# Patient Record
(demographics unavailable — no encounter records)

---

## 2024-11-26 NOTE — HEALTH RISK ASSESSMENT
[Excellent] : ~his/her~  mood as  excellent [No falls in past year] : Patient reported no falls in the past year [0] : 2) Feeling down, depressed, or hopeless: Not at all (0) [PHQ-2 Negative - No further assessment needed] : PHQ-2 Negative - No further assessment needed [Never] : Never [HIV Test offered] : HIV Test offered [Change in mental status noted] : Change in mental status noted [None] : None [With Family] : lives with family [# of Members in Household ___] :  household currently consist of [unfilled] member(s) [Retired] : retired [] :  [Fully functional (bathing, dressing, toileting, transferring, walking, feeding)] : Fully functional (bathing, dressing, toileting, transferring, walking, feeding) [NO] : No [Patient declined colonoscopy] : Patient declined colonoscopy [Hepatitis C test offered] : Hepatitis C test offered [No] : No [[id number]] : 0 [Reports changes in hearing] : Reports no changes in hearing [Reports changes in vision] : Reports no changes in vision [ColonoscopyDate] : never [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

## 2024-11-26 NOTE — PLAN
[FreeTextEntry1] : #b/l impacted cerumen #hearing loss performed b/l ear flushing given referral to ENT  #Dementia follows with Dr. Stephenson for neurology renewed memantine 10mg BID  #HLD sent lipid panel Advised patient on regular exercise, weight loss, and maintaining a low fat diet renewed simvastatin 20mg QD   #HTN BP in office today 120/70 Encouraged regular exercise, weight loss, adhering to a DASH diet, minimizing alcohol intake, getting adequate sleep, and monitoring BP at home regularly Advised to take blood pressure at home if patient experiences dizziness, lightheadedness, or get a sudden headache  renewed lisinopril 20mg QD  #Health Maintenance Has never had colonoscopy performed. Patient is now 77 y/o Routine EKG performed today Routine labs sent including CBC, CMP, A1C, Lipid Profile, TSH w/ reflex, HIV, Hep C, UA, PSA administered flu vaccine today

## 2024-11-26 NOTE — PHYSICAL EXAM
[No Acute Distress] : no acute distress [Well Nourished] : well nourished [Well Developed] : well developed [Well-Appearing] : well-appearing [Normal Sclera/Conjunctiva] : normal sclera/conjunctiva [PERRL] : pupils equal round and reactive to light [EOMI] : extraocular movements intact [Normal Oropharynx] : the oropharynx was normal [No JVD] : no jugular venous distention [No Lymphadenopathy] : no lymphadenopathy [Supple] : supple [Thyroid Normal, No Nodules] : the thyroid was normal and there were no nodules present [No Respiratory Distress] : no respiratory distress  [No Accessory Muscle Use] : no accessory muscle use [Clear to Auscultation] : lungs were clear to auscultation bilaterally [Normal Rate] : normal rate  [Regular Rhythm] : with a regular rhythm [Normal S1, S2] : normal S1 and S2 [No Murmur] : no murmur heard [No Carotid Bruits] : no carotid bruits [No Abdominal Bruit] : a ~M bruit was not heard ~T in the abdomen [No Varicosities] : no varicosities [Pedal Pulses Present] : the pedal pulses are present [No Edema] : there was no peripheral edema [No Palpable Aorta] : no palpable aorta [No Extremity Clubbing/Cyanosis] : no extremity clubbing/cyanosis [Soft] : abdomen soft [Non Tender] : non-tender [Non-distended] : non-distended [No Masses] : no abdominal mass palpated [No HSM] : no HSM [Normal Bowel Sounds] : normal bowel sounds [Normal Posterior Cervical Nodes] : no posterior cervical lymphadenopathy [Normal Anterior Cervical Nodes] : no anterior cervical lymphadenopathy [No CVA Tenderness] : no CVA  tenderness [No Spinal Tenderness] : no spinal tenderness [No Joint Swelling] : no joint swelling [Grossly Normal Strength/Tone] : grossly normal strength/tone [No Rash] : no rash [Coordination Grossly Intact] : coordination grossly intact [No Focal Deficits] : no focal deficits [Normal Gait] : normal gait [Deep Tendon Reflexes (DTR)] : deep tendon reflexes were 2+ and symmetric [Normal Affect] : the affect was normal [Normal Insight/Judgement] : insight and judgment were intact [de-identified] : b/l impacted cerumen

## 2024-11-26 NOTE — COUNSELING
[Fall prevention counseling provided] : Fall prevention counseling provided [Adequate lighting] : Adequate lighting [Use proper foot wear] : Use proper foot wear [Behavioral health counseling provided] : Behavioral health counseling provided [Sleep ___ hours/day] : Sleep [unfilled] hours/day [Engage in a relaxing activity] : Engage in a relaxing activity

## 2024-11-26 NOTE — HISTORY OF PRESENT ILLNESS
[FreeTextEntry1] : CPE [de-identified] : 77 y/o M with PMH of HLD, HTN, and dementia presents to establish care at his annual wellness visit. He notes he is feeling great and needs refills on all his medications. His wife and niece accompanied him and notes that they feel his hearing has decreased and he occasionally complains of ear pain.

## 2025-03-12 NOTE — PHYSICAL EXAM
[General Appearance - Alert] : alert [Affect] : the affect was normal [Oriented to Person] : oriented to person [Recall ___ / 3] : recall [unfilled] / 3 [Cranial Nerves Optic (II)] : visual acuity intact bilaterally,  visual fields full to confrontation, pupils equal round and reactive to light [Cranial Nerves Oculomotor (III)] : extraocular motion intact [Cranial Nerves Trigeminal (V)] : facial sensation intact symmetrically [Cranial Nerves Facial (VII)] : face symmetrical [Cranial Nerves Vestibulocochlear (VIII)] : hearing was intact bilaterally [Cranial Nerves Glossopharyngeal (IX)] : tongue and palate midline [Cranial Nerves Accessory (XI - Cranial And Spinal)] : head turning and shoulder shrug symmetric [Cranial Nerves Hypoglossal (XII)] : there was no tongue deviation with protrusion [Motor Tone] : muscle tone was normal in all four extremities [Motor Strength] : muscle strength was normal in all four extremities [Sensation Tactile Decrease] : light touch was intact [Sensation Pain / Temperature Decrease] : pain and temperature was intact [Sensation Vibration Decrease] : vibration was intact [Abnormal Walk] : normal gait [2+] : Patella left 2+ [Optic Disc Abnormality] : the optic disc were normal in size and color [Oriented to Place] : disoriented to place [Oriented to Time] : disoriented to time [Dysarthria] : no dysarthria [Romberg's Sign] : Romberg's sign was negtive [Coordination - Dysmetria Impaired Finger-to-Nose Bilateral] : not present [Plantar Reflex Right Only] : normal on the right [Plantar Reflex Left Only] : normal on the left

## 2025-03-12 NOTE — HISTORY OF PRESENT ILLNESS
[FreeTextEntry1] : I saw this patient in the office today. He presents with his wife.  As you recall the patient had no insight as to why he was referred here. His wife reported that he has been having memory difficulty since the beginning of 2021. It is now very severe.  He had seen a neurologist at [facility] and was apparently diagnosed with Alzheimer's dementia and was started on galantamine. His wife reports that it has not been working, however, it is not clear that she understood what to expect from it.  After thorough discussion I had switched him to memantine at her request.  3/12/2025 visit: He presents with his wife as usual. His wife reports that he has remained stable since his last visit. He has good days and bad days.

## 2025-03-12 NOTE — ASSESSMENT
[FreeTextEntry1] : This is a 78-year-old man with what appears to be fairly severe Alzheimer's dementia. It is hard to believe that this really began only 1 year prior to his initial visit here. More likely he has some mild cognitive impairment that went unnoticed by his family.  I had explained to the patient's wife that there are no medications that reverse memory loss. I had explained that medications such as galantamine and memantine have been shown to slow down the progression of functional decline. Memantine is typically used when dementia is more severe.  His wife wished to switch medications.  He is now on memantine 10 mg twice per day.  I will see him back in 6 months.

## 2025-03-12 NOTE — CONSULT LETTER
[Courtesy Letter:] : I had the pleasure of seeing your patient, [unfilled], in my office today. [Please see my note below.] : Please see my note below. [Consult Closing:] : Thank you very much for allowing me to participate in the care of this patient.  If you have any questions, please do not hesitate to contact me. [Sincerely,] : Sincerely, [Dear  ___] : Dear  [unfilled], [FreeTextEntry3] : Rory Stephenson MD.

## 2025-03-12 NOTE — DATA REVIEWED
(YFN SHETTY) 2 MG/0.85ML injection Inject 0.85 mLs into the skin every 7 days Every tuesday      finasteride (PROSCAR) 5 MG tablet Take 1 tablet by mouth daily      gabapentin (NEURONTIN) 300 MG capsule Take 1 capsule by mouth in the morning and 1 capsule in the evening.      sodium bicarbonate 325 MG tablet Take 1 tablet by mouth 2 times daily      metoprolol succinate (TOPROL XL) 50 MG extended release tablet Take 1 tablet by mouth daily Indications: High Blood Pressure Disorder      midodrine (PROAMATINE) 5 MG tablet Take 1 tablet by mouth 3 times daily Indications: Disorder of Low Blood Pressure Hold for SBP> 110      metoclopramide (REGLAN) 5 MG tablet Take 1 tablet by mouth 3 times daily (before meals) Indications: Nausea      B Complex-C-E-Zn (STRESS B/ZINC) TABS Take 1 tablet by mouth daily Indications: Wound Care      Multiple Vitamins-Minerals (THERAVIM-M PO) Take 1 tablet by mouth daily Indications: Nutritional Support      vitamin C (ASCORBIC ACID) 500 MG tablet Take 1 tablet by mouth daily Indications: Wound Care      vitamin D (CHOLECALCIFEROL) 25 MCG (1000 UT) TABS tablet Take 1 tablet by mouth daily Indications: Nutritional Support      aspirin 81 MG EC tablet Take 1 tablet by mouth daily Indications: CAD      Insulin Lispro-aabc, 1 U Dial, 100 UNIT/ML SOPN Inject 0-10 Units into the skin 3 times daily (before meals) Inject as per sliding scale: If 150-200=2u; 201-250=4u; 251-300=6u; 301-350=8u; 351-400=10u;      tamsulosin (FLOMAX) 0.4 MG capsule Take 1 capsule by mouth once daily (Patient taking differently: Take 1 capsule by mouth at bedtime Indications: Urinary Tract Infection) 90 capsule 0    rosuvastatin (CRESTOR) 20 MG tablet Take 1 tablet by mouth daily Indications: High Amount of Fats in the Blood      predniSONE (DELTASONE) 5 MG tablet Take 1 tablet by mouth daily Indications: Chronic Obstructive Lung Disease      Continuous Blood Gluc Transmit (DEXCOM G6 TRANSMITTER) MISC Change every  3 months 1 each 3    Continuous Blood Gluc Sensor (FREESTYLE KEV 14 DAY SENSOR) MISC Every 2 weeks Dx E11.65 2 each 06    Insulin Regular Human (NOVOLIN R FLEXPEN) 100 UNIT/ML SOPN 6 units am 8 units lunch and dinner (Patient taking differently: Inject into the skin See Admin Instructions Indications: Type 2 Diabetes Give 5 units every morning and 7 units every night.) 5 pen 3    Continuous Blood Gluc Sensor (FREESTYLE KEV 14 DAY SENSOR) MISC Every 2 weeks 2 each 06    pantoprazole (PROTONIX) 40 MG tablet Take 1 tablet by mouth daily Indications: Gastroesophageal Reflux Disease      cycloSPORINE modified (NEORAL) 25 MG capsule Take 3 capsules by mouth 2 times daily Indications: Acute Kidney Disease  2    mycophenolate (CELLCEPT) 250 MG capsule Take 3 capsules by mouth 3 times daily Indications: Kidney Transplant  1       Allergies   Allergen Reactions    Statins Headaches and Other (See Comments)         Family History   Problem Relation Age of Onset    Colon Cancer Neg Hx          Physical Exam:     Blood pressure 128/78, pulse 96, temperature 98.2 °F (36.8 °C), temperature source Oral, resp. rate 18, height 1.88 m (6' 2\"), weight 80.6 kg (177 lb 11.2 oz), SpO2 99 %.  General: Patient appears ok at the present time. NAD  Skin: no new rashes  HEENT:  Neck is supple, No subconjunctival hemorrhages, no oral exudates  Heart: S1 S2  Lungs: clear bilaterally   Abdomen: soft, ND, NTTP,   Back :no CVA tenderness  Extrem: No edema, non tender  Neuro exam: CN II-XII intact  Psych: cooperative    Labs:  I have reviewed all lab results by electronic record, including most recent CBC, metabolic panel, and pertinent abnormalities were addressed from an infectious disease perspective.  Trends are being monitored over time.   Lab Results   Component Value Date    WBC 6.6 07/30/2024    HGB 8.0 (L) 07/30/2024    HCT 25.4 (L) 07/30/2024    MCV 89.8 07/30/2024     07/30/2024     Lab Results   Component Value Date/Time    NA  143 07/30/2024 06:02 AM    K 4.3 07/30/2024 06:02 AM    K 4.8 10/04/2022 04:15 AM     07/30/2024 06:02 AM    CO2 20 07/30/2024 06:02 AM    BUN 92 07/30/2024 06:02 AM    CREATININE 2.71 07/30/2024 06:02 AM    GLUCOSE 210 07/30/2024 06:02 AM    GLUCOSE 156 09/26/2023 12:51 PM    CALCIUM 8.9 07/30/2024 06:02 AM    LABGLOM 24.1 07/30/2024 06:02 AM    LABGLOM 20.1 03/23/2023 08:04 AM        Radiology:  I have reviewed imaging results per electronic record and most pertinent abnormalities are being addressed from an infectious disease standpoint.             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      PLAN:  Micafungin.,  While patient having a voiding challenge can continue but not planning on continuing this as an outpatient if remains stable..      [de-identified] : Brain MRI was performed on 4/4/2022 at [facility].\par  The study demonstrated only mild to moderate chronic ischemic change and chronic microhemorrhage in the left parietal lobe.\par

## 2025-04-30 NOTE — HISTORY OF PRESENT ILLNESS
[FreeTextEntry1] : follow up  [de-identified] : follow up lipid htn alzheimers has been having back pan

## 2025-06-18 NOTE — HISTORY OF PRESENT ILLNESS
[FreeTextEntry1] : follow up dementia htn hld  [de-identified] : follow up dementia hld htn has been ok needs letter for immigration  no fever no sob nvd  or palpitatons today some headache related to neck pain